# Patient Record
Sex: MALE | URBAN - METROPOLITAN AREA
[De-identification: names, ages, dates, MRNs, and addresses within clinical notes are randomized per-mention and may not be internally consistent; named-entity substitution may affect disease eponyms.]

---

## 2024-02-13 ENCOUNTER — APPOINTMENT (RX ONLY)
Dept: URBAN - METROPOLITAN AREA CLINIC 146 | Facility: CLINIC | Age: 59
Setting detail: DERMATOLOGY
End: 2024-02-13

## 2024-02-13 DIAGNOSIS — G57.1 MERALGIA PARESTHETICA: ICD-10-CM | Status: INADEQUATELY CONTROLLED

## 2024-02-13 DIAGNOSIS — L72.0 EPIDERMAL CYST: ICD-10-CM | Status: WORSENING

## 2024-02-13 PROBLEM — G57.12 MERALGIA PARESTHETICA, LEFT LOWER LIMB: Status: ACTIVE | Noted: 2024-02-13

## 2024-02-13 PROCEDURE — ? SURGICAL DECISION MAKING

## 2024-02-13 PROCEDURE — ? COUNSELING

## 2024-02-13 PROCEDURE — 99203 OFFICE O/P NEW LOW 30 MIN: CPT

## 2024-02-13 PROCEDURE — ? DEFER

## 2024-02-13 PROCEDURE — ? TREATMENT REGIMEN

## 2024-02-13 ASSESSMENT — LOCATION SIMPLE DESCRIPTION DERM
LOCATION SIMPLE: LEFT POSTERIOR THIGH
LOCATION SIMPLE: LEFT EAR

## 2024-02-13 ASSESSMENT — LOCATION DETAILED DESCRIPTION DERM
LOCATION DETAILED: LEFT POSTERIOR EAR
LOCATION DETAILED: LEFT PROXIMAL LATERAL POSTERIOR THIGH

## 2024-02-13 ASSESSMENT — LOCATION ZONE DERM
LOCATION ZONE: LEG
LOCATION ZONE: EAR

## 2024-02-13 NOTE — PROCEDURE: SURGICAL DECISION MAKING
Discussion: Risks, benefits, and alternatives to treatment were discussed today. Risks including infection, bleeding, scarring and recurrence addressed.
Risk Assessment Explanation (Does Not Render In The Note): Clinical determination of the probability and/or consequences of an event, such as surgery. Clinical assessment of the level of risk is affected by the nature of the event under consideration for the patient. Modifier 57 is used to indicate an Evaluation and Management (E/M) service resulted in the initial decision to perform surgery either the day before a major surgery (90 day global) or the day of a major surgery.
Initial Decision For Surgery?: Yes

## 2024-02-13 NOTE — PROCEDURE: DEFER
Detail Level: Detailed
Size Of Lesion In Cm (Optional): 0
Procedure To Be Performed At Next Visit: Excision
Introduction Text (Please End With A Colon): The following procedure was deferred:
Instructions (Optional): Refer to Dr. Mcgowan.

## 2024-02-13 NOTE — HPI: SUBCUTANEOUS GROWTH
How Severe Is Your Growth?: mild
Has Your Growth Been Treated?: not been treated
Is This A New Presentation, Or A Follow-Up?: Subcutaneous Growth
Additional History: Possibly causing headaches and ear infection?